# Patient Record
Sex: FEMALE | Race: WHITE | NOT HISPANIC OR LATINO | Employment: STUDENT | URBAN - NONMETROPOLITAN AREA
[De-identification: names, ages, dates, MRNs, and addresses within clinical notes are randomized per-mention and may not be internally consistent; named-entity substitution may affect disease eponyms.]

---

## 2023-06-15 ENCOUNTER — OFFICE VISIT (OUTPATIENT)
Dept: CARDIOLOGY | Facility: CLINIC | Age: 23
End: 2023-06-15
Payer: COMMERCIAL

## 2023-06-15 VITALS
SYSTOLIC BLOOD PRESSURE: 110 MMHG | HEART RATE: 99 BPM | HEIGHT: 60 IN | WEIGHT: 76 LBS | BODY MASS INDEX: 14.92 KG/M2 | DIASTOLIC BLOOD PRESSURE: 64 MMHG

## 2023-06-15 DIAGNOSIS — R01.1 HEART MURMUR: ICD-10-CM

## 2023-06-15 DIAGNOSIS — R06.02 SHORTNESS OF BREATH: ICD-10-CM

## 2023-06-15 DIAGNOSIS — R00.2 PALPITATIONS: Primary | ICD-10-CM

## 2023-06-15 DIAGNOSIS — R07.2 PRECORDIAL PAIN: ICD-10-CM

## 2023-06-15 DIAGNOSIS — F41.9 ANXIETY: ICD-10-CM

## 2023-06-15 DIAGNOSIS — R42 DIZZINESS: ICD-10-CM

## 2023-06-15 RX ORDER — BISOPROLOL FUMARATE 5 MG/1
5 TABLET, FILM COATED ORAL DAILY
Qty: 30 TABLET | Refills: 6 | Status: SHIPPED | OUTPATIENT
Start: 2023-06-15

## 2023-06-15 RX ORDER — FAMOTIDINE 10 MG
10 TABLET ORAL DAILY PRN
COMMUNITY

## 2023-06-15 RX ORDER — OMEPRAZOLE 20 MG/1
20 CAPSULE, DELAYED RELEASE ORAL DAILY
COMMUNITY

## 2023-06-15 RX ORDER — VENLAFAXINE HYDROCHLORIDE 37.5 MG/1
37.5 CAPSULE, EXTENDED RELEASE ORAL DAILY
COMMUNITY

## 2023-06-15 RX ORDER — FLUDROCORTISONE ACETATE 0.1 MG/1
0.1 TABLET ORAL DAILY
Qty: 30 TABLET | Refills: 6 | Status: SHIPPED | OUTPATIENT
Start: 2023-06-15

## 2023-06-15 NOTE — LETTER
Kristen 15, 2023       No Recipients    Patient: Katlin Bronson   YOB: 2000   Date of Visit: 6/15/2023       Dear Dr. Chávez Recipients:    Thank you for referring Katlin Bronson to me for evaluation. Below are the relevant portions of my assessment and plan of care.    If you have questions, please do not hesitate to call me. I look forward to following Katlin along with you.         Sincerely,        Fina Preston MD        CC:   No Recipients    Fina Preston MD  06/15/23 1730  Sign when Signing Visit  Chief Complaint   Patient presents with   • Establish Care     Referred for palpitations. Patient was in ER on 06/08/23 for chest pain while she was at [facility] for gastric emptying study. ER report that includes labs is in door along with EKG and CXR. CTA of abdomen from 2021 is also in door. States that gastric emptying stomach was normal. States that her gallbladder scan showed that it was working at 94%, states that they are trying omeprazole and pepcid to see if it helps, not wanting to do surgery now.    • Aspirin     Patient is not on aspirin daily.   • Palpitations     States that she has palpitations frequently especially since dealing with stomach issues. She states that they happen if she bends, stands up, or moves to the side. She states occasionally she will have one and it will go away and other times they last for a few minutes. States that they do make her have some shortness of breath and gets lightheaded.         CARDIAC COMPLAINTS  chest pressure/discomfort, Dizziness, fatigue, and palpitations      Subjective  Katlin Bronson is a 22 y.o. female came in today with her mother for the initial cardiac evaluation.  She has no previously diagnosed cardiac history.  She had COVID infection in 2021 and later she had the vaccine.  Around that time she started noticing problems in the form of recurrent abdominal discomfort, palpitation and chest pain.  She has been to 3 different  emergency room including one at [facility], at  and also at [facility].  She has undergone CT of the abdomen found to have sludge in the gallbladder.  She apparently had a HIDA scan done at [facility] which reported as hyperdynamic.  She had a gastric emptying time at this hospital when she developed chest pain and palpitation and went to the emergency room.  Work-up so far showed no obvious changes.  She also has been noticing increasing dizziness and lightheadedness.  She also has not been eating properly and she has lost fairly large amount of weight.  She normally weighs around 90 pounds and now she weighs around 76 pounds with a BMI of less than 15.  Because of the worsening of the symptoms she is now referred for further evaluation.  Her lab work which was done at the hospital showed normal electrolytes, normal renal function, normal protein and albumin.  Her blood count was normal.  She has no history of smoking or drinking alcohol.  She drinks some occasional caffeine which she stopped now.  Her mother has hypercholesterolemia, hypertension and Crohn's disease.  Her father has hypertension and hypercholesterolemia.  Her paternal grandmother has A-fib    History reviewed. No pertinent surgical history.    Current Outpatient Medications   Medication Sig Dispense Refill   • famotidine (PEPCID) 10 MG tablet Take 1 tablet by mouth Daily As Needed for Heartburn.     • omeprazole (priLOSEC) 20 MG capsule Take 1 capsule by mouth Daily.     • venlafaxine XR (EFFEXOR-XR) 37.5 MG 24 hr capsule Take 1 capsule by mouth Daily.     • bisoprolol (ZEBeta) 5 MG tablet Take 1 tablet by mouth Daily. 30 tablet 6   • fludrocortisone 0.1 MG tablet Take 1 tablet by mouth Daily. 30 tablet 6     No current facility-administered medications for this visit.           ALLERGIES:  Patient has no known allergies.    Past Medical History:   Diagnosis Date   • Anxiety    • History of colonoscopy    • History of  "esophagogastroduodenoscopy (EGD)    • History of placement of ear tubes        Social History     Tobacco Use   Smoking Status Never   Smokeless Tobacco Never          Family History   Problem Relation Age of Onset   • Hyperlipidemia Mother    • Hypertension Mother    • Crohn's disease Mother    • Hyperlipidemia Father    • Hypertension Father    • Heart disease Maternal Grandfather    • Heart disease Paternal Grandmother    • Atrial fibrillation Paternal Grandmother    • Heart disease Paternal Grandfather        Review of Systems   Constitutional: Negative for decreased appetite and malaise/fatigue.   HENT:  Negative for congestion and sore throat.    Eyes:  Negative for blurred vision, double vision and visual disturbance.   Cardiovascular:  Positive for chest pain, dyspnea on exertion and palpitations.   Respiratory:  Positive for shortness of breath. Negative for snoring.    Endocrine: Negative for cold intolerance and heat intolerance.   Hematologic/Lymphatic: Negative for adenopathy. Does not bruise/bleed easily.   Skin:  Negative for itching, nail changes and skin cancer.   Musculoskeletal:  Negative for arthritis and myalgias.   Gastrointestinal:  Negative for abdominal pain, dysphagia and heartburn.   Genitourinary:  Negative for bladder incontinence and frequency.   Neurological:  Negative for dizziness, seizures and vertigo.   Psychiatric/Behavioral:  Negative for altered mental status.    Allergic/Immunologic: Negative for environmental allergies and hives.     Diabetes- No  Thyroid- normal    Objective    /64 (BP Location: Left arm)   Pulse 99   Ht 152.4 cm (60\")   Wt 34.5 kg (76 lb)   BMI 14.84 kg/m²     Vitals and nursing note reviewed.   Constitutional:       Appearance: Healthy appearance. Not in distress.   Eyes:      Conjunctiva/sclera: Conjunctivae normal.      Pupils: Pupils are equal, round, and reactive to light.   HENT:      Head: Normocephalic.   Pulmonary:      Effort: Pulmonary " effort is normal.      Breath sounds: Normal breath sounds.   Cardiovascular:      PMI at left midclavicular line. Normal rate. Regular rhythm.      Murmurs: There is a grade 3/6 high frequency blowing holosystolic murmur at the apex.   Abdominal:      General: Bowel sounds are normal.      Palpations: Abdomen is soft.   Musculoskeletal: Normal range of motion.      Cervical back: Normal range of motion and neck supple. Skin:     General: Skin is warm and dry.   Neurological:      Mental Status: Alert, oriented to person, place, and time and oriented to person, place and time.       ECG 12 Lead    Date/Time: 6/15/2023 5:29 PM  Performed by: Fina Preston MD  Authorized by: Fina Preston MD   Previous ECG: no previous ECG available  Rhythm: sinus rhythm  Rate: normal  QRS axis: normal    Clinical impression: normal ECG        @ASSESSMENT/PLAN@  BMI is below normal parameters (malnutrition). Recommendations: referral to primary care and treating the underlying disease process     Diagnoses and all orders for this visit:    1. Palpitations (Primary)  -     bisoprolol (ZEBeta) 5 MG tablet; Take 1 tablet by mouth Daily.  Dispense: 30 tablet; Refill: 6  -     Holter Monitor - 72 Hour Up To 15 Days; Future  -     TSH; Future  -     Magnesium; Future    2. Precordial pain  -     Treadmill Stress Test; Future  -     High Sensitivity CRP; Future  -     Sedimentation Rate; Future  -     Amylase; Future  -     Lipase; Future    3. Anxiety    4. Shortness of breath  -     Adult Transthoracic Echo Complete W/ Cont if Necessary Per Protocol; Future    5. Heart murmur  -     Adult Transthoracic Echo Complete W/ Cont if Necessary Per Protocol; Future    6. Dizziness  -     fludrocortisone 0.1 MG tablet; Take 1 tablet by mouth Daily.  Dispense: 30 tablet; Refill: 6  -     Holter Monitor - 72 Hour Up To 15 Days; Future    On clinical examination her heart rate is almost 100.  Blood pressure lower limit of normal.  Her  BMI is stated is less than 15.  EKG showed normal sinus rhythm at a heart rate of 99 with no significant ST-T changes.  Her cardiovascular examination reveals short systolic murmur at the mitral area.    Regarding the palpitation, she is tachycardic and I am not sure whether that is only arrhythmia or whether she has any other arrhythmia.  At this time I started her on low-dose of bisoprolol at 5 mg.  I advised her to wear a monitor for the next 1 week.  Also advised her to check a TSH and magnesium level.    Regarding the chest pain, it appears to be atypical.  She is already on Prilosec and use Pepcid as needed.  I advised her to check a sed rate, CRP level.  Her CRP was elevated about a year ago her like to follow-up with that.  Also advised her to check amylase and lipase level.  I also scheduled her to undergo a stress test to evaluate her functional status, chronotropic response, blood pressure response and to rule out any stress-induced arrhythmia    Regarding her dizziness which could be related to hypotension I started her on Florinef 4.1 mg.  If she continues to have the symptoms, then she may need tilt table to rule out POTS    Regarding her anxiety, at this time she is on Effexor.  Advised her to continue that    Regarding her shortness of breath and also with her heart murmur, like to get an echocardiogram to evaluate the LV function, valvular structures as well as the PA pressure    Based on the results, further recommendations will be made.  I will see her back in few weeks.               Electronically signed by Fina Preston MD Kristen 15, 2023 17:20 EDT

## 2023-06-15 NOTE — PROGRESS NOTES
Chief Complaint   Patient presents with   • Establish Care     Referred for palpitations. Patient was in ER on 06/08/23 for chest pain while she was at [facility] for gastric emptying study. ER report that includes labs is in door along with EKG and CXR. CTA of abdomen from 2021 is also in door. States that gastric emptying stomach was normal. States that her gallbladder scan showed that it was working at 94%, states that they are trying omeprazole and pepcid to see if it helps, not wanting to do surgery now.    • Aspirin     Patient is not on aspirin daily.   • Palpitations     States that she has palpitations frequently especially since dealing with stomach issues. She states that they happen if she bends, stands up, or moves to the side. She states occasionally she will have one and it will go away and other times they last for a few minutes. States that they do make her have some shortness of breath and gets lightheaded.         CARDIAC COMPLAINTS  chest pressure/discomfort, Dizziness, fatigue, and palpitations      Subjective   Katlin Bronson is a 22 y.o. female came in today with her mother for the initial cardiac evaluation.  She has no previously diagnosed cardiac history.  She had COVID infection in 2021 and later she had the vaccine.  Around that time she started noticing problems in the form of recurrent abdominal discomfort, palpitation and chest pain.  She has been to 3 different emergency room including one at [facility], at  and also at [facility].  She has undergone CT of the abdomen found to have sludge in the gallbladder.  She apparently had a HIDA scan done at [facility] which reported as hyperdynamic.  She had a gastric emptying time at this hospital when she developed chest pain and palpitation and went to the emergency room.  Work-up so far showed no obvious changes.  She also has been noticing increasing dizziness and lightheadedness.  She also has not been eating properly and she has  lost fairly large amount of weight.  She normally weighs around 90 pounds and now she weighs around 76 pounds with a BMI of less than 15.  Because of the worsening of the symptoms she is now referred for further evaluation.  Her lab work which was done at the hospital showed normal electrolytes, normal renal function, normal protein and albumin.  Her blood count was normal.  She has no history of smoking or drinking alcohol.  She drinks some occasional caffeine which she stopped now.  Her mother has hypercholesterolemia, hypertension and Crohn's disease.  Her father has hypertension and hypercholesterolemia.  Her paternal grandmother has A-fib    History reviewed. No pertinent surgical history.    Current Outpatient Medications   Medication Sig Dispense Refill   • famotidine (PEPCID) 10 MG tablet Take 1 tablet by mouth Daily As Needed for Heartburn.     • omeprazole (priLOSEC) 20 MG capsule Take 1 capsule by mouth Daily.     • venlafaxine XR (EFFEXOR-XR) 37.5 MG 24 hr capsule Take 1 capsule by mouth Daily.     • bisoprolol (ZEBeta) 5 MG tablet Take 1 tablet by mouth Daily. 30 tablet 6   • fludrocortisone 0.1 MG tablet Take 1 tablet by mouth Daily. 30 tablet 6     No current facility-administered medications for this visit.           ALLERGIES:  Patient has no known allergies.    Past Medical History:   Diagnosis Date   • Anxiety    • History of colonoscopy    • History of esophagogastroduodenoscopy (EGD)    • History of placement of ear tubes        Social History     Tobacco Use   Smoking Status Never   Smokeless Tobacco Never          Family History   Problem Relation Age of Onset   • Hyperlipidemia Mother    • Hypertension Mother    • Crohn's disease Mother    • Hyperlipidemia Father    • Hypertension Father    • Heart disease Maternal Grandfather    • Heart disease Paternal Grandmother    • Atrial fibrillation Paternal Grandmother    • Heart disease Paternal Grandfather        Review of Systems   Constitutional:  "Negative for decreased appetite and malaise/fatigue.   HENT:  Negative for congestion and sore throat.    Eyes:  Negative for blurred vision, double vision and visual disturbance.   Cardiovascular:  Positive for chest pain, dyspnea on exertion and palpitations.   Respiratory:  Positive for shortness of breath. Negative for snoring.    Endocrine: Negative for cold intolerance and heat intolerance.   Hematologic/Lymphatic: Negative for adenopathy. Does not bruise/bleed easily.   Skin:  Negative for itching, nail changes and skin cancer.   Musculoskeletal:  Negative for arthritis and myalgias.   Gastrointestinal:  Negative for abdominal pain, dysphagia and heartburn.   Genitourinary:  Negative for bladder incontinence and frequency.   Neurological:  Negative for dizziness, seizures and vertigo.   Psychiatric/Behavioral:  Negative for altered mental status.    Allergic/Immunologic: Negative for environmental allergies and hives.     Diabetes- No  Thyroid- normal    Objective     /64 (BP Location: Left arm)   Pulse 99   Ht 152.4 cm (60\")   Wt 34.5 kg (76 lb)   BMI 14.84 kg/m²     Vitals and nursing note reviewed.   Constitutional:       Appearance: Healthy appearance. Not in distress.   Eyes:      Conjunctiva/sclera: Conjunctivae normal.      Pupils: Pupils are equal, round, and reactive to light.   HENT:      Head: Normocephalic.   Pulmonary:      Effort: Pulmonary effort is normal.      Breath sounds: Normal breath sounds.   Cardiovascular:      PMI at left midclavicular line. Normal rate. Regular rhythm.      Murmurs: There is a grade 3/6 high frequency blowing holosystolic murmur at the apex.   Abdominal:      General: Bowel sounds are normal.      Palpations: Abdomen is soft.   Musculoskeletal: Normal range of motion.      Cervical back: Normal range of motion and neck supple. Skin:     General: Skin is warm and dry.   Neurological:      Mental Status: Alert, oriented to person, place, and time and " oriented to person, place and time.       ECG 12 Lead    Date/Time: 6/15/2023 5:29 PM  Performed by: Fina Preston MD  Authorized by: Fina Preston MD   Previous ECG: no previous ECG available  Rhythm: sinus rhythm  Rate: normal  QRS axis: normal    Clinical impression: normal ECG        @ASSESSMENT/PLAN@  BMI is below normal parameters (malnutrition). Recommendations: referral to primary care and treating the underlying disease process     Diagnoses and all orders for this visit:    1. Palpitations (Primary)  -     bisoprolol (ZEBeta) 5 MG tablet; Take 1 tablet by mouth Daily.  Dispense: 30 tablet; Refill: 6  -     Holter Monitor - 72 Hour Up To 15 Days; Future  -     TSH; Future  -     Magnesium; Future    2. Precordial pain  -     Treadmill Stress Test; Future  -     High Sensitivity CRP; Future  -     Sedimentation Rate; Future  -     Amylase; Future  -     Lipase; Future    3. Anxiety    4. Shortness of breath  -     Adult Transthoracic Echo Complete W/ Cont if Necessary Per Protocol; Future    5. Heart murmur  -     Adult Transthoracic Echo Complete W/ Cont if Necessary Per Protocol; Future    6. Dizziness  -     fludrocortisone 0.1 MG tablet; Take 1 tablet by mouth Daily.  Dispense: 30 tablet; Refill: 6  -     Holter Monitor - 72 Hour Up To 15 Days; Future    On clinical examination her heart rate is almost 100.  Blood pressure lower limit of normal.  Her BMI is stated is less than 15.  EKG showed normal sinus rhythm at a heart rate of 99 with no significant ST-T changes.  Her cardiovascular examination reveals short systolic murmur at the mitral area.    Regarding the palpitation, she is tachycardic and I am not sure whether that is only arrhythmia or whether she has any other arrhythmia.  At this time I started her on low-dose of bisoprolol at 5 mg.  I advised her to wear a monitor for the next 1 week.  Also advised her to check a TSH and magnesium level.    Regarding the chest pain, it  appears to be atypical.  She is already on Prilosec and use Pepcid as needed.  I advised her to check a sed rate, CRP level.  Her CRP was elevated about a year ago her like to follow-up with that.  Also advised her to check amylase and lipase level.  I also scheduled her to undergo a stress test to evaluate her functional status, chronotropic response, blood pressure response and to rule out any stress-induced arrhythmia    Regarding her dizziness which could be related to hypotension I started her on Florinef 4.1 mg.  If she continues to have the symptoms, then she may need tilt table to rule out POTS    Regarding her anxiety, at this time she is on Effexor.  Advised her to continue that    Regarding her shortness of breath and also with her heart murmur, like to get an echocardiogram to evaluate the LV function, valvular structures as well as the PA pressure    Based on the results, further recommendations will be made.  I will see her back in few weeks.               Electronically signed by Fina Preston MD Kristen 15, 2023 17:20 EDT

## 2023-06-16 ENCOUNTER — PATIENT ROUNDING (BHMG ONLY) (OUTPATIENT)
Dept: CARDIOLOGY | Facility: CLINIC | Age: 23
End: 2023-06-16
Payer: COMMERCIAL

## 2023-08-16 ENCOUNTER — OFFICE VISIT (OUTPATIENT)
Dept: CARDIOLOGY | Facility: CLINIC | Age: 23
End: 2023-08-16
Payer: COMMERCIAL

## 2023-08-16 VITALS
BODY MASS INDEX: 16.1 KG/M2 | HEIGHT: 60 IN | HEART RATE: 76 BPM | SYSTOLIC BLOOD PRESSURE: 88 MMHG | DIASTOLIC BLOOD PRESSURE: 58 MMHG | WEIGHT: 82 LBS

## 2023-08-16 DIAGNOSIS — R06.02 SHORTNESS OF BREATH: ICD-10-CM

## 2023-08-16 DIAGNOSIS — R07.2 PRECORDIAL PAIN: ICD-10-CM

## 2023-08-16 DIAGNOSIS — F41.9 ANXIETY: ICD-10-CM

## 2023-08-16 DIAGNOSIS — R42 DIZZINESS: ICD-10-CM

## 2023-08-16 DIAGNOSIS — R00.2 PALPITATIONS: Primary | ICD-10-CM

## 2023-08-16 PROCEDURE — 99214 OFFICE O/P EST MOD 30 MIN: CPT | Performed by: INTERNAL MEDICINE

## 2023-08-16 RX ORDER — BISOPROLOL FUMARATE 5 MG/1
5 TABLET, FILM COATED ORAL DAILY
Qty: 90 TABLET | Refills: 3 | Status: SHIPPED | OUTPATIENT
Start: 2023-08-16

## 2023-08-16 RX ORDER — FLUDROCORTISONE ACETATE 0.1 MG/1
0.1 TABLET ORAL DAILY
Qty: 90 TABLET | Refills: 3 | Status: SHIPPED | OUTPATIENT
Start: 2023-08-16

## 2023-08-16 RX ORDER — FAMOTIDINE 10 MG
10 TABLET ORAL DAILY PRN
Qty: 90 TABLET | Refills: 3 | Status: SHIPPED | OUTPATIENT
Start: 2023-08-16

## 2023-08-16 RX ORDER — BISOPROLOL FUMARATE 5 MG/1
5 TABLET, FILM COATED ORAL DAILY
Qty: 30 TABLET | Refills: 6 | Status: SHIPPED | OUTPATIENT
Start: 2023-08-16 | End: 2023-08-16 | Stop reason: SDUPTHER

## 2023-08-16 NOTE — PROGRESS NOTES
Chief Complaint   Patient presents with    Follow-up     For cardiac management, doing well.Stomach issues much improved.     Palpitations     Rarely has any, only if she's late on taking her medication.     Labs     Most recent labs from July in chart.     Med Refill     Would like 90 days supply with refills on meds to [facility].     Fatigue     Having a lot of fatigue, asking if decreasing the medication might be an option.        CARDIAC COMPLAINTS  fatigue        Subjective   Katlin Bronson is a 22 y.o. female came in with the mother for follow-up visit.  She had COVID infection in 2021 and after which she also had the vaccine.  Around the time she started having symptoms of palpitation chest pain she has been to multiple different areas regarding this.  She also had a HIDA scan which showed a hyperdynamic gallbladder function he also had gastric emptying time done.  She underwent few investigation which includes a Holter monitor which showed rare PAC and PVC.  She underwent an echocardiogram which showed mild MR otherwise within normal limit.  She also underwent a stress test which was completely normal.  She was put on a low-dose of bisoprolol and Florinef.  She also was put on Pepcid for possible reflux.  She came today stating that her palpitation is much better she rarely has any only if she forgot what late in taking medication.  Her fatigue is still there.  She is still not been drinking enough water.              Cardiac History  Past Surgical History:   Procedure Laterality Date    CARDIOVASCULAR STRESS TEST  07/03/2023    R.Stress- 11.22 Min. 13.4 METS. 67% THR. 135/66. Inconclusive    CONVERTED (HISTORICAL) HOLTER  06/28/2023    5 Days. Avg 72.     ECHO - CONVERTED  07/03/2023    EF 60%. Mild MR       Current Outpatient Medications   Medication Sig Dispense Refill    bisoprolol (ZEBeta) 5 MG tablet Take 1 tablet by mouth Daily. Reduce it to 1/2 90 tablet 3    famotidine (PEPCID) 10  MG tablet Take 1 tablet by mouth Daily As Needed for Heartburn. 90 tablet 3    fludrocortisone 0.1 MG tablet Take 1 tablet by mouth Daily. 90 tablet 3    omeprazole (priLOSEC) 20 MG capsule Take 1 capsule by mouth Daily.      venlafaxine XR (EFFEXOR-XR) 37.5 MG 24 hr capsule Take 1 capsule by mouth Daily.       No current facility-administered medications for this visit.       Allergies  :  Patient has no known allergies.       Past Medical History:   Diagnosis Date    Anxiety     History of colonoscopy     History of esophagogastroduodenoscopy (EGD)     History of placement of ear tubes        Social History     Socioeconomic History    Marital status: Single   Tobacco Use    Smoking status: Never    Smokeless tobacco: Never   Vaping Use    Vaping Use: Former   Substance and Sexual Activity    Alcohol use: Never    Drug use: Never       Family History   Problem Relation Age of Onset    Hyperlipidemia Mother     Hypertension Mother     Crohn's disease Mother     Hyperlipidemia Father     Hypertension Father     Heart disease Maternal Grandfather     Heart disease Paternal Grandmother     Atrial fibrillation Paternal Grandmother     Heart disease Paternal Grandfather        Review of Systems   Constitutional: Positive for malaise/fatigue. Negative for decreased appetite.   HENT:  Negative for congestion and sore throat.    Eyes:  Negative for blurred vision, double vision and visual disturbance.   Cardiovascular:  Positive for chest pain. Negative for palpitations.   Respiratory:  Negative for shortness of breath and snoring.    Endocrine: Negative for cold intolerance and heat intolerance.   Hematologic/Lymphatic: Negative for adenopathy. Does not bruise/bleed easily.   Skin:  Negative for itching, nail changes and skin cancer.   Musculoskeletal:  Negative for arthritis and myalgias.   Gastrointestinal:  Negative for abdominal pain, dysphagia and heartburn.   Genitourinary:  Negative for  "bladder incontinence and frequency.   Neurological:  Positive for dizziness. Negative for seizures and vertigo.   Psychiatric/Behavioral:  Negative for altered mental status.    Allergic/Immunologic: Negative for environmental allergies and hives.     Diabetes- No  Thyroid- normal    Objective     BP (!) 88/58   Pulse 76   Ht 152.4 cm (60\")   Wt 37.2 kg (82 lb)   BMI 16.01 kg/mý     Vitals and nursing note reviewed.   Constitutional:       Appearance: Healthy appearance. Not in distress.   Eyes:      Conjunctiva/sclera: Conjunctivae normal.      Pupils: Pupils are equal, round, and reactive to light.   HENT:      Head: Normocephalic.   Pulmonary:      Effort: Pulmonary effort is normal.      Breath sounds: Normal breath sounds.   Cardiovascular:      PMI at left midclavicular line. Normal rate. Regular rhythm.   Abdominal:      General: Bowel sounds are normal.      Palpations: Abdomen is soft.   Musculoskeletal: Normal range of motion.      Cervical back: Normal range of motion and neck supple. Skin:     General: Skin is warm and dry.   Neurological:      Mental Status: Alert, oriented to person, place, and time and oriented to person, place and time.   Procedures            @ASSESSMENT/PLAN@        Diagnoses and all orders for this visit:    1. Palpitations (Primary)  -     Discontinue: bisoprolol (ZEBeta) 5 MG tablet; Take 1 tablet by mouth Daily. Reduce it to 1/2  Dispense: 30 tablet; Refill: 6  -     bisoprolol (ZEBeta) 5 MG tablet; Take 1 tablet by mouth Daily. Reduce it to 1/2  Dispense: 90 tablet; Refill: 3    2. Anxiety    3. Dizziness  -     fludrocortisone 0.1 MG tablet; Take 1 tablet by mouth Daily.  Dispense: 90 tablet; Refill: 3    4. Shortness of breath    5. Precordial pain  -     famotidine (PEPCID) 10 MG tablet; Take 1 tablet by mouth Daily As Needed for Heartburn.  Dispense: 90 tablet; Refill: 3       At baseline her heart rate is stable.  Her blood pressure is running mildly low.  Her BMI is " little better than before around 16.  Her cardiovascular examination is unremarkable.    Regarding the palpitation, she is doing much better.  So I advised her to reduce the dose of bisoprolol to half a tablet a day.  I did advise her to increase her fluid intake.    Regarding her anxiety, she is doing very well with the Effexor.  At this time I did not change any of the dose    Regarding the dizziness which is secondary to hypotension she is on Florinef 4.1.  I am afraid to increase the dose which may cause her to have leg edema.  Hopefully by increasing the fluid intake she will do better    Regarding the shortness of breath, it seems to be getting better after the anxiety level is controlled so continue to monitor    Regarding the chest pain, it appears to be secondary to GI and Pepcid seems to be helping.  Continue the same    I will see her back in 1 year or sooner if needed                  Electronically signed by Fina Preston MD August 16, 2023 13:50 EDT

## 2023-08-16 NOTE — LETTER
August 16, 2023       No Recipients    Patient: Katlin Bronson   YOB: 2000   Date of Visit: 8/16/2023     Dear GUILLERMINA White:       Thank you for referring Katlin Bronson to me for evaluation. Below are the relevant portions of my assessment and plan of care.    If you have questions, please do not hesitate to call me. I look forward to following Katlin along with you.         Sincerely,        Fina Preston MD        CC:   No Recipients    Fina Preston MD  08/16/23 1357  Sign when Signing Visit  Chief Complaint   Patient presents with    Follow-up     For cardiac management, doing well.Stomach issues much improved.     Palpitations     Rarely has any, only if she's late on taking her medication.     Labs     Most recent labs from July in chart.     Med Refill     Would like 90 days supply with refills on meds to [facility].     Fatigue     Having a lot of fatigue, asking if decreasing the medication might be an option.        CARDIAC COMPLAINTS  fatigue        Subjective  Katlin Bronson is a 22 y.o. female came in with the mother for follow-up visit.  She had COVID infection in 2021 and after which she also had the vaccine.  Around the time she started having symptoms of palpitation chest pain she has been to multiple different areas regarding this.  She also had a HIDA scan which showed a hyperdynamic gallbladder function he also had gastric emptying time done.  She underwent few investigation which includes a Holter monitor which showed rare PAC and PVC.  She underwent an echocardiogram which showed mild MR otherwise within normal limit.  She also underwent a stress test which was completely normal.  She was put on a low-dose of bisoprolol and Florinef.  She also was put on Pepcid for possible reflux.  She came today stating that her palpitation is much better she rarely has any only if she forgot what late in taking medication.  Her fatigue is still there.  She is  still not been drinking enough water.              Cardiac History  Past Surgical History:   Procedure Laterality Date    CARDIOVASCULAR STRESS TEST  07/03/2023    R.Stress- 11.22 Min. 13.4 METS. 67% THR. 135/66. Inconclusive    CONVERTED (HISTORICAL) HOLTER  06/28/2023    5 Days. Avg 72.     ECHO - CONVERTED  07/03/2023    EF 60%. Mild MR       Current Outpatient Medications   Medication Sig Dispense Refill    bisoprolol (ZEBeta) 5 MG tablet Take 1 tablet by mouth Daily. Reduce it to 1/2 90 tablet 3    famotidine (PEPCID) 10 MG tablet Take 1 tablet by mouth Daily As Needed for Heartburn. 90 tablet 3    fludrocortisone 0.1 MG tablet Take 1 tablet by mouth Daily. 90 tablet 3    omeprazole (priLOSEC) 20 MG capsule Take 1 capsule by mouth Daily.      venlafaxine XR (EFFEXOR-XR) 37.5 MG 24 hr capsule Take 1 capsule by mouth Daily.       No current facility-administered medications for this visit.       Allergies  :  Patient has no known allergies.       Past Medical History:   Diagnosis Date    Anxiety     History of colonoscopy     History of esophagogastroduodenoscopy (EGD)     History of placement of ear tubes        Social History     Socioeconomic History    Marital status: Single   Tobacco Use    Smoking status: Never    Smokeless tobacco: Never   Vaping Use    Vaping Use: Former   Substance and Sexual Activity    Alcohol use: Never    Drug use: Never       Family History   Problem Relation Age of Onset    Hyperlipidemia Mother     Hypertension Mother     Crohn's disease Mother     Hyperlipidemia Father     Hypertension Father     Heart disease Maternal Grandfather     Heart disease Paternal Grandmother     Atrial fibrillation Paternal Grandmother     Heart disease Paternal Grandfather        Review of Systems   Constitutional: Positive for malaise/fatigue. Negative for decreased appetite.   HENT:  Negative for congestion and sore throat.    Eyes:  Negative for blurred vision,  "double vision and visual disturbance.   Cardiovascular:  Positive for chest pain. Negative for palpitations.   Respiratory:  Negative for shortness of breath and snoring.    Endocrine: Negative for cold intolerance and heat intolerance.   Hematologic/Lymphatic: Negative for adenopathy. Does not bruise/bleed easily.   Skin:  Negative for itching, nail changes and skin cancer.   Musculoskeletal:  Negative for arthritis and myalgias.   Gastrointestinal:  Negative for abdominal pain, dysphagia and heartburn.   Genitourinary:  Negative for bladder incontinence and frequency.   Neurological:  Positive for dizziness. Negative for seizures and vertigo.   Psychiatric/Behavioral:  Negative for altered mental status.    Allergic/Immunologic: Negative for environmental allergies and hives.     Diabetes- No  Thyroid- normal    Objective    BP (!) 88/58   Pulse 76   Ht 152.4 cm (60\")   Wt 37.2 kg (82 lb)   BMI 16.01 kg/mý     Vitals and nursing note reviewed.   Constitutional:       Appearance: Healthy appearance. Not in distress.   Eyes:      Conjunctiva/sclera: Conjunctivae normal.      Pupils: Pupils are equal, round, and reactive to light.   HENT:      Head: Normocephalic.   Pulmonary:      Effort: Pulmonary effort is normal.      Breath sounds: Normal breath sounds.   Cardiovascular:      PMI at left midclavicular line. Normal rate. Regular rhythm.   Abdominal:      General: Bowel sounds are normal.      Palpations: Abdomen is soft.   Musculoskeletal: Normal range of motion.      Cervical back: Normal range of motion and neck supple. Skin:     General: Skin is warm and dry.   Neurological:      Mental Status: Alert, oriented to person, place, and time and oriented to person, place and time.   Procedures            @ASSESSMENT/PLAN@        Diagnoses and all orders for this visit:    1. Palpitations (Primary)  -     Discontinue: bisoprolol (ZEBeta) 5 MG tablet; Take 1 tablet by mouth Daily. Reduce it to 1/2  Dispense: 30 " tablet; Refill: 6  -     bisoprolol (ZEBeta) 5 MG tablet; Take 1 tablet by mouth Daily. Reduce it to 1/2  Dispense: 90 tablet; Refill: 3    2. Anxiety    3. Dizziness  -     fludrocortisone 0.1 MG tablet; Take 1 tablet by mouth Daily.  Dispense: 90 tablet; Refill: 3    4. Shortness of breath    5. Precordial pain  -     famotidine (PEPCID) 10 MG tablet; Take 1 tablet by mouth Daily As Needed for Heartburn.  Dispense: 90 tablet; Refill: 3       At baseline her heart rate is stable.  Her blood pressure is running mildly low.  Her BMI is little better than before around 16.  Her cardiovascular examination is unremarkable.    Regarding the palpitation, she is doing much better.  So I advised her to reduce the dose of bisoprolol to half a tablet a day.  I did advise her to increase her fluid intake.    Regarding her anxiety, she is doing very well with the Effexor.  At this time I did not change any of the dose    Regarding the dizziness which is secondary to hypotension she is on Florinef 4.1.  I am afraid to increase the dose which may cause her to have leg edema.  Hopefully by increasing the fluid intake she will do better    Regarding the shortness of breath, it seems to be getting better after the anxiety level is controlled so continue to monitor    Regarding the chest pain, it appears to be secondary to GI and Pepcid seems to be helping.  Continue the same    I will see her back in 1 year or sooner if needed                  Electronically signed by Fina Preston MD August 16, 2023 13:50 EDT

## 2023-12-15 DIAGNOSIS — R42 DIZZINESS: ICD-10-CM

## 2023-12-18 RX ORDER — FLUDROCORTISONE ACETATE 0.1 MG/1
TABLET ORAL DAILY
Qty: 180 TABLET | OUTPATIENT
Start: 2023-12-18

## 2024-05-12 DIAGNOSIS — R00.2 PALPITATIONS: ICD-10-CM

## 2024-07-09 RX ORDER — BISOPROLOL FUMARATE 5 MG/1
5 TABLET, FILM COATED ORAL DAILY
Qty: 30 TABLET | Refills: 0 | Status: SHIPPED | OUTPATIENT
Start: 2024-07-09